# Patient Record
Sex: FEMALE | Race: WHITE | NOT HISPANIC OR LATINO | URBAN - METROPOLITAN AREA
[De-identification: names, ages, dates, MRNs, and addresses within clinical notes are randomized per-mention and may not be internally consistent; named-entity substitution may affect disease eponyms.]

---

## 2021-09-24 ENCOUNTER — OUTPATIENT (OUTPATIENT)
Dept: OUTPATIENT SERVICES | Facility: HOSPITAL | Age: 46
LOS: 1 days | Discharge: HOME | End: 2021-09-24

## 2021-09-24 DIAGNOSIS — Z11.59 ENCOUNTER FOR SCREENING FOR OTHER VIRAL DISEASES: ICD-10-CM

## 2021-09-27 ENCOUNTER — OUTPATIENT (OUTPATIENT)
Dept: OUTPATIENT SERVICES | Facility: HOSPITAL | Age: 46
LOS: 1 days | Discharge: HOME | End: 2021-09-27
Payer: MEDICAID

## 2021-09-27 PROCEDURE — 88173 CYTOPATH EVAL FNA REPORT: CPT | Mod: 26

## 2021-09-27 PROCEDURE — 38505 NEEDLE BIOPSY LYMPH NODES: CPT

## 2021-09-27 PROCEDURE — 88172 CYTP DX EVAL FNA 1ST EA SITE: CPT | Mod: 26

## 2021-09-27 PROCEDURE — 76942 ECHO GUIDE FOR BIOPSY: CPT | Mod: 26

## 2021-09-27 NOTE — PROCEDURE NOTE - PROCEDURE FINDINGS AND DETAILS
46-year-old female. Initially evaluated for elevated direct tests leading to imaging studies demonstrating a dominant left level 2 lymph node. Referred for tissue sampling. No history of malignancy.  Under sonographic guidance, multiple fine needle aspirations obtained using 22 and 25-gauge needles. Specimens were deemed adequate upon preliminary review by Dr. Delgado, prepared slides. In addition material was submitted for flow cytometry.  Instructions given for follow-up.

## 2021-09-28 LAB — TM INTERPRETATION: SIGNIFICANT CHANGE UP

## 2021-10-01 LAB — NON-GYNECOLOGICAL CYTOLOGY STUDY: SIGNIFICANT CHANGE UP

## 2021-10-06 DIAGNOSIS — R59.0 LOCALIZED ENLARGED LYMPH NODES: ICD-10-CM

## 2021-11-01 ENCOUNTER — EMERGENCY (EMERGENCY)
Facility: HOSPITAL | Age: 46
LOS: 0 days | Discharge: HOME | End: 2021-11-01
Attending: EMERGENCY MEDICINE | Admitting: EMERGENCY MEDICINE
Payer: MEDICAID

## 2021-11-01 VITALS
OXYGEN SATURATION: 99 % | SYSTOLIC BLOOD PRESSURE: 178 MMHG | HEART RATE: 107 BPM | RESPIRATION RATE: 17 BRPM | DIASTOLIC BLOOD PRESSURE: 82 MMHG

## 2021-11-01 VITALS
SYSTOLIC BLOOD PRESSURE: 190 MMHG | WEIGHT: 139.99 LBS | OXYGEN SATURATION: 97 % | DIASTOLIC BLOOD PRESSURE: 100 MMHG | TEMPERATURE: 98 F | RESPIRATION RATE: 25 BRPM | HEART RATE: 112 BPM

## 2021-11-01 DIAGNOSIS — K64.4 RESIDUAL HEMORRHOIDAL SKIN TAGS: ICD-10-CM

## 2021-11-01 DIAGNOSIS — K62.89 OTHER SPECIFIED DISEASES OF ANUS AND RECTUM: ICD-10-CM

## 2021-11-01 PROCEDURE — 99284 EMERGENCY DEPT VISIT MOD MDM: CPT

## 2021-11-01 RX ORDER — DOCUSATE SODIUM 100 MG
1 CAPSULE ORAL
Qty: 14 | Refills: 0
Start: 2021-11-01 | End: 2021-11-07

## 2021-11-01 RX ORDER — KETOROLAC TROMETHAMINE 30 MG/ML
30 SYRINGE (ML) INJECTION ONCE
Refills: 0 | Status: DISCONTINUED | OUTPATIENT
Start: 2021-11-01 | End: 2021-11-01

## 2021-11-01 RX ORDER — MINERAL OIL, PETROLATUM, PHENYLEPHRINE HCL 2.5; 140; 749 MG/G; MG/G; MG/G
1 OINTMENT TOPICAL
Qty: 14 | Refills: 0
Start: 2021-11-01 | End: 2021-11-07

## 2021-11-01 RX ADMIN — Medication 30 MILLIGRAM(S): at 15:27

## 2021-11-01 NOTE — ED PROVIDER NOTE - NS ED ROS FT
Constitutional: (-) fever, (-) chills  Eyes: (-) visual changes  ENT: (-) nasal congestions  Cardiovascular: (-) chest pain, (-) syncope  Respiratory: (-) cough, (-) shortness of breath, (-) dyspnea,   Gastrointestinal: (-) vomiting, (-) diarrhea, (-)nausea, (+) rectal pain  Musculoskeletal: (-) neck pain, (-) back pain, (-) joint pain,  Integumentary: (-) rash, (-) edema, (-) bruises  Neurological: (-) headache, (-) loc, (-) dizziness, (-) tingling, (-)numbness  Peripheral Vascular: (-) leg swelling  :  (-)dysuria,  (-) hematuria  Allergic/Immunologic: (-) pruritus

## 2021-11-01 NOTE — ED PROVIDER NOTE - CARE PROVIDER_API CALL
Frank Montoya)  ColonRectal Surgery  [address] [place] [address], [address] [place] [address]  Phone: [phone]  Fax: [phone]  Follow Up Time: 1-3 Days

## 2021-11-01 NOTE — ED PROVIDER NOTE - PHYSICAL EXAMINATION
Physical Exam    Vital Signs: I have reviewed the initial vital signs.  Constitutional: well-nourished, appears stated age, no acute distress  Eyes: Conjunctiva pink, Sclera clear  Gastrointestinal: soft, non-tender abdomen, no pulsatile mass, normal bowl sounds  Rectal: chaperoned with prabha Gonzalez, larger hemorrhoid non thrombosed, brown stool    Musculoskeletal: supple neck, no lower extremity edema, no midline tenderness  Integumentary: warm, dry, no rash  Neurologic: awake, alert,  nvi

## 2021-11-01 NOTE — ED PROVIDER NOTE - ATTENDING CONTRIBUTION TO CARE
46 yr old female here for eval of rectal pain. pt reports swelling and pain. is able to pass stool, but states that her stool is very hard. no rectal bleeding. on exam pt with diffuse non thrombosed hemmorids to external rectum. abd soft nt/nd.                                                                                                                                                            impression external hemmoroids, non thrombosed. plan for stool softener, perpetration H,  and outpt GI or colorectal surgery

## 2021-11-01 NOTE — ED ADULT TRIAGE NOTE - CHIEF COMPLAINT QUOTE
pt states she is unable to have bowel movement, "it feels like something is blocking it" c/o rectal pain

## 2021-11-01 NOTE — ED PROVIDER NOTE - CLINICAL SUMMARY MEDICAL DECISION MAKING FREE TEXT BOX
external hemmoroids, non thrombosed. plan for stool softener, perpetration H,  and outpt GI or colorectal surgery

## 2021-11-01 NOTE — ED PROVIDER NOTE - OBJECTIVE STATEMENT
47 yo female, no pmh, presents to ed for rectal pain, mild, no radiation, started today, has had hemorrhoids in past. Denies fever, chills, cp, sob, abd pain, nvd.

## 2021-11-01 NOTE — ED PROVIDER NOTE - PATIENT PORTAL LINK FT
You can access the FollowMyHealth Patient Portal offered by [facility] by registering at the following website: [email or web address]. By joining [facility]’s FollowMyHealth portal, you will also be able to view your health information using other applications (apps) compatible with our system.

## 2021-11-22 PROBLEM — Z00.00 ENCOUNTER FOR PREVENTIVE HEALTH EXAMINATION: Status: ACTIVE | Noted: 2021-11-22

## 2025-06-06 NOTE — PROCEDURE NOTE - NSPERFORMEDBY_GEN_A_CORE
Is Thalidomide Contraindicated?: No Detail Level: Zone Dupixent Monitoring Guidelines: There is no laboratory monitoring requirement with Dupixent. Myself Dupixent Dosing: 600 mg SC day 0 then 300 mg SC every other week Pregnancy And Lactation Warning Text: There have not been adverse fetal risks in women taking Dupixent while pregnant. It is unknown if this medication is excreted in breast milk. Diagnosis (Required): Atopic Dermatitis/Eczematous Dermatitis